# Patient Record
Sex: MALE | Race: WHITE | NOT HISPANIC OR LATINO | Employment: FULL TIME | URBAN - METROPOLITAN AREA
[De-identification: names, ages, dates, MRNs, and addresses within clinical notes are randomized per-mention and may not be internally consistent; named-entity substitution may affect disease eponyms.]

---

## 2018-09-03 ENCOUNTER — HOSPITAL ENCOUNTER (EMERGENCY)
Facility: HOSPITAL | Age: 53
Discharge: HOME | End: 2018-09-03
Attending: EMERGENCY MEDICINE | Admitting: EMERGENCY MEDICINE

## 2018-09-03 VITALS
DIASTOLIC BLOOD PRESSURE: 75 MMHG | HEIGHT: 73 IN | WEIGHT: 185 LBS | HEART RATE: 75 BPM | BODY MASS INDEX: 24.52 KG/M2 | TEMPERATURE: 98 F | RESPIRATION RATE: 18 BRPM | SYSTOLIC BLOOD PRESSURE: 112 MMHG | OXYGEN SATURATION: 95 %

## 2018-09-03 DIAGNOSIS — K64.4 EXTERNAL HEMORRHOID, BLEEDING: Primary | ICD-10-CM

## 2018-09-03 PROCEDURE — 99283 EMERGENCY DEPT VISIT LOW MDM: CPT

## 2018-09-03 RX ORDER — AMLODIPINE BESYLATE 10 MG/1
10 TABLET ORAL DAILY
COMMUNITY

## 2018-09-03 NOTE — ED PROVIDER NOTES
" EMERGENCY DEPARTMENT ENCOUNTER    Room Number:  40/40  Date seen:  9/3/2018  Time seen: 3:48 PM  PCP: Provider, No Known  Historian: Patient      HPI  Chief Complaint: Hemorrhoids  Context: Jay Peterson is a 52 y.o. male. Pt reports that he has rectal hemorrhoids present. Pt states that earlier today, after pt took a long bus ride while sitting on hard seats, pt felt a \"popping\" sensation in his rectum. Shortly afterwards, pt developed rectal bleeding. Pt suspects that he has ruptured one of his rectal hemorrhoids. Pt denies abdominal pain, chest pain, dyspnea, and dysuria. There are no other complaints at this time.       Location: Rectum  Radiation: None  Character: \"ruptured hemorrhoid\"   Duration: Onset earlier today  Severity: Mild  Progression: Unchanged  Aggravating Factors: Applying pressure to the buttocks  Alleviating Factors: Nothing            PAST MEDICAL HISTORY  Active Ambulatory Problems     Diagnosis Date Noted   • No Active Ambulatory Problems     Resolved Ambulatory Problems     Diagnosis Date Noted   • No Resolved Ambulatory Problems     Past Medical History:   Diagnosis Date   • Hypertension          PAST SURGICAL HISTORY  Past Surgical History:   Procedure Laterality Date   • NECK SURGERY           FAMILY HISTORY  History reviewed. No pertinent family history.      SOCIAL HISTORY  Social History     Social History   • Marital status: Single     Spouse name: N/A   • Number of children: N/A   • Years of education: N/A     Occupational History   • Not on file.     Social History Main Topics   • Smoking status: Current Every Day Smoker     Packs/day: 0.50   • Smokeless tobacco: Never Used   • Alcohol use No   • Drug use: No   • Sexual activity: Not on file     Other Topics Concern   • Not on file     Social History Narrative   • No narrative on file         ALLERGIES  Patient has no known allergies.        REVIEW OF SYSTEMS  Review of Systems   Constitutional: Negative.    Eyes: Negative for visual " disturbance.   Respiratory: Negative for shortness of breath.    Cardiovascular: Negative for chest pain.   Gastrointestinal: Negative for abdominal pain, nausea and vomiting.        Hemorrhoids   Genitourinary: Negative for dysuria.   Musculoskeletal: Negative for neck pain.   Skin: Negative.    Neurological: Negative for syncope, weakness, numbness and headaches.   Psychiatric/Behavioral: Negative.    All other systems reviewed and are negative.           PHYSICAL EXAM  ED Triage Vitals   Temp Heart Rate Resp BP SpO2   09/03/18 1449 09/03/18 1447 09/03/18 1447 09/03/18 1459 09/03/18 1447   98 °F (36.7 °C) 109 18 131/90 98 %      Temp src Heart Rate Source Patient Position BP Location FiO2 (%)   -- -- -- -- --              Physical Exam   Constitutional: He is oriented to person, place, and time. No distress.   Eyes: Pupils are equal, round, and reactive to light. EOM are normal.   Neck: Normal range of motion. Neck supple.   Cardiovascular: Normal rate.    Pulmonary/Chest: Effort normal. No respiratory distress. He exhibits no tenderness.   Abdominal: Soft. There is no tenderness.   Genitourinary: Rectal exam shows external hemorrhoid (on the right side; ~1 cm in size with central area of erosion that is bleeding - not thrombosed).   Neurological: He is alert and oriented to person, place, and time. He has normal sensation.   Skin: Skin is warm.   Psychiatric: Mood and affect normal.   Nursing note and vitals reviewed.              PROCEDURES  Procedures        MEDICATIONS GIVEN IN ER  Medications - No data to display          PROGRESS AND CONSULTS       3:55 PM:  Informed pt that on his rectal exam, pt has an external hemorrhoid with a central area of erosion present that is bleeding. Pt was advised to use sitz baths. Pt will be prescribed with rx for Proctofoam and will be provided with f/u referral to the colorectal surgeon. RTER warnings given. Pt agrees with plan for discharge.          MEDICAL DECISION  MAKING      MDM  Number of Diagnoses or Management Options  External hemorrhoid, bleeding:   Patient Progress  Patient progress: stable             DIAGNOSIS  Final diagnoses:   External hemorrhoid, bleeding             DISPOSITION  Pt discharged.      DISCHARGE    Patient discharged in stable condition.    Reviewed implications of results, diagnosis, meds, responsibility to follow up, warning signs and symptoms of possible worsening, potential complications and reasons to return to ER.    Patient/Family voiced understanding of above instructions.    Discussed plan for discharge, as there is no emergent indication for admission. Pt/family is agreeable and understands need for follow up and repeat testing. Pt is aware that discharge does not mean that nothing is wrong but it indicates no emergency is present that requires admission and they must continue care with follow-up as given below or physician of their choice.     FOLLOW-UP  Dennise Perea MD  [facility] [address]  [phone]               Medication List      New Prescriptions    hydrocortisone-pramoxine 1-1 % rectal foam  Commonly known as:  PROCTOFOAM-HS  Insert 1 applicator into the rectum 2 (Two) Times a Day.                        Latest Documented Vital Signs:  As of 3:53 PM  BP- 131/90 HR- 92 Temp- 98 °F (36.7 °C) O2 sat- 97%        --  Documentation assistance provided by ranjit Ham for Dr. Nicole MD.  Information recorded by the scribe was done at my direction and has been verified and validated by me.                      Marcellus Ham  09/03/18 1833       Dar Rivera MD  09/04/18 [id number]

## 2018-09-03 NOTE — ED NOTES
"PT states he took a long bus ride with hard seats recently and thinks he \"ruptured a hemorrhoid\".       Krystal Shultz RN  09/03/18 9780    "